# Patient Record
Sex: MALE | Race: WHITE | URBAN - METROPOLITAN AREA
[De-identification: names, ages, dates, MRNs, and addresses within clinical notes are randomized per-mention and may not be internally consistent; named-entity substitution may affect disease eponyms.]

---

## 2019-03-06 ENCOUNTER — OFFICE VISIT (OUTPATIENT)
Dept: URGENT CARE | Facility: URGENT CARE | Age: 19
End: 2019-03-06
Payer: COMMERCIAL

## 2019-03-06 VITALS
HEART RATE: 91 BPM | TEMPERATURE: 97.6 F | OXYGEN SATURATION: 98 % | WEIGHT: 185 LBS | SYSTOLIC BLOOD PRESSURE: 153 MMHG | DIASTOLIC BLOOD PRESSURE: 79 MMHG

## 2019-03-06 DIAGNOSIS — R03.0 ELEVATED BP WITHOUT DIAGNOSIS OF HYPERTENSION: ICD-10-CM

## 2019-03-06 DIAGNOSIS — G43.809 OTHER MIGRAINE WITHOUT STATUS MIGRAINOSUS, NOT INTRACTABLE: Primary | ICD-10-CM

## 2019-03-06 PROCEDURE — 99203 OFFICE O/P NEW LOW 30 MIN: CPT | Performed by: INTERNAL MEDICINE

## 2019-03-06 RX ORDER — BUTALBITAL/ASPIRIN/CAFFEINE 50-325-40
1 CAPSULE ORAL
COMMUNITY
Start: 2018-07-09 | End: 2019-03-06

## 2019-03-06 RX ORDER — BUTALBITAL/ASPIRIN/CAFFEINE 50-325-40
1 CAPSULE ORAL EVERY 6 HOURS PRN
Qty: 30 CAPSULE | Refills: 0 | Status: SHIPPED | OUTPATIENT
Start: 2019-03-06

## 2019-03-07 NOTE — PROGRESS NOTES
SUBJECTIVE:  Jaydon Gutierrez is an 18 year old male who presents for not feeling well.  Has h/o headaches and usually the fiorinal he has help.  Is out of medication.  Headache started a couple weeks ago and hasn't resolved fully because ran out of medication.  No n/v.  No fevers.  Headache more on left than right and more toward the front.  Is sensitive to the sun with headache.  Is taking some amoxicillin for sinus infection currently.  No fevers, chills, sweats.     PMH:  Migraines.      Social History     Socioeconomic History     Marital status: Single     Spouse name: Not on file     Number of children: Not on file     Years of education: Not on file     Highest education level: Not on file   Occupational History     Not on file   Social Needs     Financial resource strain: Not on file     Food insecurity:     Worry: Not on file     Inability: Not on file     Transportation needs:     Medical: Not on file     Non-medical: Not on file   Tobacco Use     Smoking status: Not on file   Substance and Sexual Activity     Alcohol use: Not on file     Drug use: Not on file     Sexual activity: Not on file   Lifestyle     Physical activity:     Days per week: Not on file     Minutes per session: Not on file     Stress: Not on file   Relationships     Social connections:     Talks on phone: Not on file     Gets together: Not on file     Attends [employer] service: Not on file     Active member of club or organization: Not on file     Attends meetings of clubs or organizations: Not on file     Relationship status: Not on file     Intimate partner violence:     Fear of current or ex partner: Not on file     Emotionally abused: Not on file     Physically abused: Not on file     Forced sexual activity: Not on file   Other Topics Concern     Not on file   Social History Narrative     Not on file     FH: mom with migraines    ALLERGIES:  Patient has no known allergies.    Current Outpatient Medications   Medication      butalbital-aspirin-caffeine (FIORINAL) -40 MG per capsule     No current facility-administered medications for this visit.          ROS:  ROS is done and is negative for general/constitutional, eye, ENT, Respiratory, cardiovascular, GI, , Skin, musculoskeletal except as noted elsewhere.  All other review of systems negative except as noted elsewhere.      OBJECTIVE:  /79   Pulse 91   Temp 97.6  F (36.4  C) (Oral)   Wt 83.9 kg (185 lb)   SpO2 98%   GENERAL APPEARANCE: Alert, in no acute distress  EYES: normal  EARS: negative  NOSE:normal  OROPHARYNX:normal  NECK:No adenopathy,masses or thyromegaly  RESP: normal and clear to auscultation  CV:regular rate and rhythm and no murmurs, clicks, or gallops  ABDOMEN: Abdomen soft, non-tender. BS normal. No masses, organomegaly  SKIN: no ulcers, lesions or rash  MUSCULOSKELETAL:Musculoskeletal normal  NEURO: CN 2-12 grossly intact.  Strength 5/5 and symmetric in bilateral upper and lower extremities.  DTRs 2+ and symmetric in bilateral upper and lower extremities.  Sensation to light touch grossly intact in bilateral upper and lower extremities.    RESULTS  No results found for this or any previous visit..  No results found for this or any previous visit (from the past 48 hour(s)).    ASSESSMENT/PLAN:    ASSESSMENT / PLAN:  (G43.809) Other migraine without status migrainosus, not intractable  (primary encounter diagnosis)  Comment: sxs are c/w his prior migraines which have responded to fiorinal but he is out of medication and so did not have any to take on onset of this headache. Neuro exam is normal.  Currently no signs of stroke or brain mass. Neuro exam is normal  Plan: butalbital-aspirin-caffeine (FIORINAL)         -40 MG per capsule        Refill of his medication is provided.  He is advised to f/u with pcp for any future refills. Reviewed medication instructions and side effects. Follow up if experiences side effects.. Advised that med can make  drowsy or altered and is not to drive, operate machinery or consume alcohol or street drugs when taking.  I reviewed supportive care, otc meds to use if needed, expected course, and signs of concern.  Follow up as needed or if he does not improve within 3 day(s) or if worsens in any way.  Reviewed red flag symptoms and is to go to the ER if experiences any of these.    (R03.0) Elevated BP without diagnosis of hypertension  Comment: likely due to pain  Plan: Recheck BP in 1-2 weeks with a nurse visit or a visit to primary care doctor.      See [facility] for orders, medications, letters, patient instructions    Essie Alvarez M.D.